# Patient Record
Sex: FEMALE
[De-identification: names, ages, dates, MRNs, and addresses within clinical notes are randomized per-mention and may not be internally consistent; named-entity substitution may affect disease eponyms.]

---

## 2017-06-14 ENCOUNTER — HOSPITAL ENCOUNTER (OUTPATIENT)
Dept: HOSPITAL 11 - JP.SDS | Age: 67
Discharge: HOME | End: 2017-06-14
Attending: SURGERY
Payer: COMMERCIAL

## 2017-06-14 VITALS — SYSTOLIC BLOOD PRESSURE: 146 MMHG | DIASTOLIC BLOOD PRESSURE: 89 MMHG

## 2017-06-14 DIAGNOSIS — Z12.11: Primary | ICD-10-CM

## 2017-06-14 DIAGNOSIS — K21.9: ICD-10-CM

## 2017-06-14 DIAGNOSIS — E11.9: ICD-10-CM

## 2017-06-14 DIAGNOSIS — K57.30: ICD-10-CM

## 2017-06-14 DIAGNOSIS — I10: ICD-10-CM

## 2017-06-14 DIAGNOSIS — Z86.73: ICD-10-CM

## 2017-06-14 PROCEDURE — 45378 DIAGNOSTIC COLONOSCOPY: CPT

## 2017-06-14 NOTE — OR
DATE OF PROCEDURE:  06/14/2017

 

PREOPERATIVE DIAGNOSIS:  Colon cancer screening.

 

POSTOPERATIVE DIAGNOSIS:  Diverticulosis.

 

PROCEDURE:  Colonoscopy to the cecum.

 

ANESTHESIA:  IV anesthesia with monitored anesthesia care.

 

INDICATIONS:  This 66-year-old white female is referred for a colonoscopy for colon cancer

screening.  She says her last colonoscopic exam was done 10 years ago.  I counseled her for

the procedure including risks and alternatives, and she gave her informed consent to

proceed.

 

DESCRIPTION OF PROCEDURE:  The patient was placed in the left lateral decubitus position.

IV anesthesia was administered by the Anesthesia Service.  Time-out was held.  A rectal exam

was performed, which was unremarkable.  The flexible video Olympus colonoscope was

introduced through her anus, up her rectum, and out her colon all the way to the cecum.  En

route, we saw a few scattered left-sided diverticula.  Once the cecum was reached, the scope

was slowly withdrawn examining the mucosa throughout.  No mucosal abnormalities were noted,

other than the left-sided diverticula.  There was no bleeding or inflammation associated

with any of them.  The scope was retroflexed in the rectum with the distal rectum appearing

unremarkable.  The scope was straightened and removed.  She tolerated the procedure well.

 

 

 

 

Nasir Freire MD

DD:  06/14/2017 09:32:21

DT:  06/14/2017 10:55:19

Job #:  [id number]

## 2023-10-07 ENCOUNTER — HOSPITAL ENCOUNTER (INPATIENT)
Dept: HOSPITAL 11 - JP.ED | Age: 73
LOS: 5 days | Discharge: HOME | DRG: 392 | End: 2023-10-12
Attending: INTERNAL MEDICINE | Admitting: INTERNAL MEDICINE
Payer: MEDICARE

## 2023-10-07 DIAGNOSIS — Z88.5: ICD-10-CM

## 2023-10-07 DIAGNOSIS — E03.9: ICD-10-CM

## 2023-10-07 DIAGNOSIS — Z88.2: ICD-10-CM

## 2023-10-07 DIAGNOSIS — Z90.79: ICD-10-CM

## 2023-10-07 DIAGNOSIS — Z79.82: ICD-10-CM

## 2023-10-07 DIAGNOSIS — Z85.41: ICD-10-CM

## 2023-10-07 DIAGNOSIS — Z79.84: ICD-10-CM

## 2023-10-07 DIAGNOSIS — Z86.73: ICD-10-CM

## 2023-10-07 DIAGNOSIS — Z20.822: ICD-10-CM

## 2023-10-07 DIAGNOSIS — Z79.899: ICD-10-CM

## 2023-10-07 DIAGNOSIS — E86.0: ICD-10-CM

## 2023-10-07 DIAGNOSIS — K52.9: Primary | ICD-10-CM

## 2023-10-07 DIAGNOSIS — Z90.12: ICD-10-CM

## 2023-10-07 DIAGNOSIS — Z90.722: ICD-10-CM

## 2023-10-07 DIAGNOSIS — I10: ICD-10-CM

## 2023-10-07 DIAGNOSIS — Z98.890: ICD-10-CM

## 2023-10-07 DIAGNOSIS — Z90.710: ICD-10-CM

## 2023-10-07 DIAGNOSIS — K59.09: ICD-10-CM

## 2023-10-07 DIAGNOSIS — E11.3553: ICD-10-CM

## 2023-10-07 DIAGNOSIS — Z88.8: ICD-10-CM

## 2023-10-07 DIAGNOSIS — T81.49XA: ICD-10-CM

## 2023-10-07 DIAGNOSIS — M19.90: ICD-10-CM

## 2023-10-07 DIAGNOSIS — Z85.3: ICD-10-CM

## 2023-10-07 LAB
ALBUMIN SERPL-MCNC: 3.7 G/DL (ref 3.4–5)
ALBUMIN/GLOB SERPL: 1 {RATIO} (ref 1.2–2.2)
ALP SERPL-CCNC: 82 U/L (ref 46–116)
ALT SERPL-CCNC: 31 U/L (ref 12–78)
ANION GAP SERPL CALC-SCNC: 16.5 MMOL/L (ref 5–14)
APTT PPP: 24.3 SEC (ref 21.8–27.3)
AST SERPL-CCNC: 20 U/L (ref 15–37)
BASOPHILS # BLD AUTO: 0.03 K/UL (ref 0–0.1)
BASOPHILS NFR BLD AUTO: 0.2 % (ref 0.1–1.3)
BILIRUB SERPL-MCNC: 0.5 MG/DL (ref 0.2–1)
BUN SERPL-MCNC: 14 MG/DL (ref 7–18)
CALCIUM SERPL-MCNC: 8.9 MG/DL (ref 8.5–10.1)
CHLORIDE SERPL-SCNC: 99 MMOL/L (ref 100–108)
CO2 SERPL-SCNC: 24 MMOL/L (ref 21–32)
CREAT CL 24H UR+SERPL-VRATE: 66.33 ML/MIN
CREAT SERPL-MCNC: 0.7 MG/DL (ref 0.6–1)
CRP SERPL-MCNC: 2.37 MG/DL (ref 0–0.3)
EOSINOPHIL # BLD AUTO: 0.23 K/UL (ref 0–0.4)
EOSINOPHIL NFR BLD AUTO: 1.5 % (ref 0–5.4)
GLOBULIN SER-MCNC: 3.6 G/DL (ref 2.3–3.5)
GLUCOSE SERPL-MCNC: 164 MG/DL (ref 74–106)
HCT VFR BLD AUTO: 47.9 % (ref 34.3–46)
HGB BLD-MCNC: 16.6 G/DL (ref 11.2–15.5)
IMM GRANULOCYTES # BLD: 0.07 K/UL (ref 0–0.23)
IMM GRANULOCYTES NFR BLD: 0.4 % (ref 0–0.7)
INR PPP: 1
LACTATE SERPL-SCNC: 1.1 MMOL/L (ref 0.4–2)
LYMPHOCYTES # BLD AUTO: 2.2 K/UL (ref 0.8–3.3)
LYMPHOCYTES NFR BLD AUTO: 14.1 % (ref 11.4–47.7)
MCH RBC QN AUTO: 28.9 PG (ref 31.6–35.5)
MCHC RBC AUTO-ENTMCNC: 34.7 G/DL (ref 31.6–35.5)
MCHC RBC AUTO-ENTMCNC: 83.4 FL (ref 81.4–99)
MONOCYTES # BLD AUTO: 1.19 K/UL (ref 0.2–0.9)
MONOCYTES NFR BLD AUTO: 7.6 % (ref 3.3–12.6)
NEUTROPHILS # BLD AUTO: 11.84 K/UL (ref 1–7.6)
NEUTROPHILS NFR BLD AUTO: 76.2 % (ref 40–78.1)
PLATELET # BLD AUTO: 337 K/UL (ref 130–375)
POTASSIUM SERPL-SCNC: 3.5 MMOL/L (ref 3.6–5.2)
PROT SERPL-MCNC: 7.3 G/DL (ref 6.4–8.2)
PROTHROMBIN TIME: 10.3 SEC (ref 9.2–10.6)
RBC # BLD AUTO: 5.74 M/UL (ref 3.77–5.24)
SODIUM SERPL-SCNC: 136 MMOL/L (ref 140–148)
WBC # BLD AUTO: 15.6 K/UL (ref 3.2–11)

## 2023-10-07 PROCEDURE — U0002 COVID-19 LAB TEST NON-CDC: HCPCS

## 2023-10-07 PROCEDURE — 82272 OCCULT BLD FECES 1-3 TESTS: CPT

## 2023-10-07 PROCEDURE — 87046 STOOL CULTR AEROBIC BACT EA: CPT

## 2023-10-07 PROCEDURE — C9113 INJ PANTOPRAZOLE SODIUM, VIA: HCPCS

## 2023-10-07 PROCEDURE — 36415 COLL VENOUS BLD VENIPUNCTURE: CPT

## 2023-10-07 PROCEDURE — 86140 C-REACTIVE PROTEIN: CPT

## 2023-10-07 PROCEDURE — 80053 COMPREHEN METABOLIC PANEL: CPT

## 2023-10-07 PROCEDURE — 85610 PROTHROMBIN TIME: CPT

## 2023-10-07 PROCEDURE — 87493 C DIFF AMPLIFIED PROBE: CPT

## 2023-10-07 PROCEDURE — 83605 ASSAY OF LACTIC ACID: CPT

## 2023-10-07 PROCEDURE — 85730 THROMBOPLASTIN TIME PARTIAL: CPT

## 2023-10-07 PROCEDURE — 74177 CT ABD & PELVIS W/CONTRAST: CPT

## 2023-10-07 PROCEDURE — 96361 HYDRATE IV INFUSION ADD-ON: CPT

## 2023-10-07 PROCEDURE — 96374 THER/PROPH/DIAG INJ IV PUSH: CPT

## 2023-10-07 PROCEDURE — 99285 EMERGENCY DEPT VISIT HI MDM: CPT

## 2023-10-07 PROCEDURE — 87899 AGENT NOS ASSAY W/OPTIC: CPT

## 2023-10-07 PROCEDURE — 85025 COMPLETE CBC W/AUTO DIFF WBC: CPT

## 2023-10-08 LAB
ANION GAP SERPL CALC-SCNC: 12.2 MMOL/L (ref 5–14)
BASOPHILS # BLD AUTO: 0.04 K/UL (ref 0–0.1)
BASOPHILS NFR BLD AUTO: 0.4 % (ref 0.1–1.3)
BUN SERPL-MCNC: 13 MG/DL (ref 7–18)
CALCIUM SERPL-MCNC: 7.8 MG/DL (ref 8.5–10.1)
CHLORIDE SERPL-SCNC: 104 MMOL/L (ref 100–108)
CO2 SERPL-SCNC: 26 MMOL/L (ref 21–32)
CREAT CL 24H UR+SERPL-VRATE: 66.33 ML/MIN
CREAT SERPL-MCNC: 0.7 MG/DL (ref 0.6–1)
EOSINOPHIL # BLD AUTO: 0.46 K/UL (ref 0–0.4)
EOSINOPHIL NFR BLD AUTO: 4.3 % (ref 0–5.4)
GLUCOSE SERPL-MCNC: 131 MG/DL (ref 74–106)
HCT VFR BLD AUTO: 41.5 % (ref 34.3–46)
HGB BLD-MCNC: 14.2 G/DL (ref 11.2–15.5)
IMM GRANULOCYTES # BLD: 0.05 K/UL (ref 0–0.23)
IMM GRANULOCYTES NFR BLD: 0.5 % (ref 0–0.7)
LYMPHOCYTES # BLD AUTO: 2.52 K/UL (ref 0.8–3.3)
LYMPHOCYTES NFR BLD AUTO: 23.8 % (ref 11.4–47.7)
MCH RBC QN AUTO: 29.2 PG (ref 31.6–35.5)
MCHC RBC AUTO-ENTMCNC: 34.2 G/DL (ref 31.6–35.5)
MCHC RBC AUTO-ENTMCNC: 85.4 FL (ref 81.4–99)
MONOCYTES # BLD AUTO: 0.79 K/UL (ref 0.2–0.9)
MONOCYTES NFR BLD AUTO: 7.4 % (ref 3.3–12.6)
NEUTROPHILS # BLD AUTO: 6.75 K/UL (ref 1–7.6)
NEUTROPHILS NFR BLD AUTO: 63.6 % (ref 40–78.1)
PLATELET # BLD AUTO: 269 K/UL (ref 130–375)
POTASSIUM SERPL-SCNC: 3.2 MMOL/L (ref 3.6–5.2)
RBC # BLD AUTO: 4.86 M/UL (ref 3.77–5.24)
SODIUM SERPL-SCNC: 139 MMOL/L (ref 140–148)
WBC # BLD AUTO: 10.6 K/UL (ref 3.2–11)

## 2023-10-08 RX ADMIN — HYDROCODONE BITARTRATE AND ACETAMINOPHEN PRN TAB: 5; 325 TABLET ORAL at 13:15

## 2023-10-08 RX ADMIN — POTASSIUM CHLORIDE SCH: 750 CAPSULE, EXTENDED RELEASE ORAL at 09:06

## 2023-10-08 RX ADMIN — HYDROCODONE BITARTRATE AND ACETAMINOPHEN PRN TAB: 5; 325 TABLET ORAL at 00:19

## 2023-10-08 RX ADMIN — TRIAMCINOLONE ACETONIDE PRN APPLIC: 1 CREAM TOPICAL at 10:27

## 2023-10-08 RX ADMIN — HYDROCODONE BITARTRATE AND ACETAMINOPHEN PRN TAB: 5; 325 TABLET ORAL at 05:29

## 2023-10-08 RX ADMIN — INSULIN LISPRO SCH: 100 INJECTION, SOLUTION INTRAVENOUS; SUBCUTANEOUS at 07:20

## 2023-10-08 RX ADMIN — INSULIN LISPRO SCH UNIT: 100 INJECTION, SOLUTION INTRAVENOUS; SUBCUTANEOUS at 21:12

## 2023-10-08 RX ADMIN — TRIAMCINOLONE ACETONIDE PRN APPLIC: 1 CREAM TOPICAL at 19:24

## 2023-10-08 RX ADMIN — Medication SCH EACH: at 12:59

## 2023-10-08 RX ADMIN — METRONIDAZOLE SCH MLS/HR: 500 SOLUTION INTRAVENOUS at 09:05

## 2023-10-08 RX ADMIN — Medication SCH EACH: at 21:13

## 2023-10-08 RX ADMIN — METRONIDAZOLE SCH MLS/HR: 500 SOLUTION INTRAVENOUS at 18:09

## 2023-10-08 RX ADMIN — Medication SCH EACH: at 12:58

## 2023-10-08 RX ADMIN — INSULIN LISPRO SCH UNIT: 100 INJECTION, SOLUTION INTRAVENOUS; SUBCUTANEOUS at 12:08

## 2023-10-08 RX ADMIN — ONDANSETRON PRN MG: 2 INJECTION, SOLUTION INTRAMUSCULAR; INTRAVENOUS at 09:00

## 2023-10-08 RX ADMIN — INSULIN LISPRO SCH: 100 INJECTION, SOLUTION INTRAVENOUS; SUBCUTANEOUS at 16:53

## 2023-10-09 LAB
ANION GAP SERPL CALC-SCNC: 12.2 MMOL/L (ref 5–14)
BUN SERPL-MCNC: 4 MG/DL (ref 7–18)
CALCIUM SERPL-MCNC: 7.6 MG/DL (ref 8.5–10.1)
CHLORIDE SERPL-SCNC: 107 MMOL/L (ref 100–108)
CO2 SERPL-SCNC: 26 MMOL/L (ref 21–32)
CREAT CL 24H UR+SERPL-VRATE: 77.38 ML/MIN
CREAT SERPL-MCNC: 0.6 MG/DL (ref 0.6–1)
GLUCOSE SERPL-MCNC: 122 MG/DL (ref 74–106)
HCT VFR BLD AUTO: 39.3 % (ref 34.3–46)
HGB BLD-MCNC: 13.1 G/DL (ref 11.2–15.5)
MCH RBC QN AUTO: 28.7 PG (ref 31.6–35.5)
MCHC RBC AUTO-ENTMCNC: 33.3 G/DL (ref 31.6–35.5)
MCHC RBC AUTO-ENTMCNC: 86.2 FL (ref 81.4–99)
PLATELET # BLD AUTO: 246 K/UL (ref 130–375)
POTASSIUM SERPL-SCNC: 3.2 MMOL/L (ref 3.6–5.2)
RBC # BLD AUTO: 4.56 M/UL (ref 3.77–5.24)
SODIUM SERPL-SCNC: 142 MMOL/L (ref 140–148)
WBC # BLD AUTO: 7.2 K/UL (ref 3.2–11)

## 2023-10-09 RX ADMIN — METRONIDAZOLE SCH MLS/HR: 500 SOLUTION INTRAVENOUS at 09:14

## 2023-10-09 RX ADMIN — INSULIN LISPRO SCH: 100 INJECTION, SOLUTION INTRAVENOUS; SUBCUTANEOUS at 08:22

## 2023-10-09 RX ADMIN — METRONIDAZOLE SCH MLS/HR: 500 SOLUTION INTRAVENOUS at 01:00

## 2023-10-09 RX ADMIN — Medication SCH EACH: at 09:17

## 2023-10-09 RX ADMIN — ONDANSETRON PRN MG: 2 INJECTION, SOLUTION INTRAMUSCULAR; INTRAVENOUS at 17:16

## 2023-10-09 RX ADMIN — TRIAMCINOLONE ACETONIDE PRN APPLIC: 1 CREAM TOPICAL at 14:10

## 2023-10-09 RX ADMIN — HYDROCODONE BITARTRATE AND ACETAMINOPHEN PRN TAB: 5; 325 TABLET ORAL at 21:41

## 2023-10-09 RX ADMIN — HYDROCODONE BITARTRATE AND ACETAMINOPHEN PRN TAB: 5; 325 TABLET ORAL at 04:05

## 2023-10-09 RX ADMIN — ONDANSETRON PRN MG: 2 INJECTION, SOLUTION INTRAMUSCULAR; INTRAVENOUS at 07:25

## 2023-10-09 RX ADMIN — TRIAMCINOLONE ACETONIDE PRN APPLIC: 1 CREAM TOPICAL at 19:52

## 2023-10-09 RX ADMIN — METRONIDAZOLE SCH MLS/HR: 500 SOLUTION INTRAVENOUS at 16:17

## 2023-10-09 RX ADMIN — INSULIN LISPRO SCH: 100 INJECTION, SOLUTION INTRAVENOUS; SUBCUTANEOUS at 21:04

## 2023-10-09 RX ADMIN — POTASSIUM CHLORIDE SCH: 750 CAPSULE, EXTENDED RELEASE ORAL at 09:17

## 2023-10-09 RX ADMIN — INSULIN LISPRO SCH: 100 INJECTION, SOLUTION INTRAVENOUS; SUBCUTANEOUS at 11:24

## 2023-10-09 RX ADMIN — Medication SCH EACH: at 21:45

## 2023-10-09 RX ADMIN — INSULIN LISPRO SCH: 100 INJECTION, SOLUTION INTRAVENOUS; SUBCUTANEOUS at 17:22

## 2023-10-10 LAB
ANION GAP SERPL CALC-SCNC: 14.5 MMOL/L (ref 5–14)
BUN SERPL-MCNC: 4 MG/DL (ref 7–18)
CALCIUM SERPL-MCNC: 8.7 MG/DL (ref 8.5–10.1)
CHLORIDE SERPL-SCNC: 104 MMOL/L (ref 100–108)
CO2 SERPL-SCNC: 26 MMOL/L (ref 21–32)
CREAT CL 24H UR+SERPL-VRATE: 66.33 ML/MIN
CREAT SERPL-MCNC: 0.7 MG/DL (ref 0.6–1)
GLUCOSE SERPL-MCNC: 111 MG/DL (ref 74–106)
POTASSIUM SERPL-SCNC: 3.5 MMOL/L (ref 3.6–5.2)
SODIUM SERPL-SCNC: 141 MMOL/L (ref 140–148)

## 2023-10-10 RX ADMIN — INSULIN LISPRO SCH: 100 INJECTION, SOLUTION INTRAVENOUS; SUBCUTANEOUS at 16:27

## 2023-10-10 RX ADMIN — METRONIDAZOLE SCH MLS/HR: 500 SOLUTION INTRAVENOUS at 08:32

## 2023-10-10 RX ADMIN — POTASSIUM CHLORIDE SCH: 750 CAPSULE, EXTENDED RELEASE ORAL at 08:33

## 2023-10-10 RX ADMIN — Medication SCH EACH: at 09:45

## 2023-10-10 RX ADMIN — INSULIN LISPRO SCH: 100 INJECTION, SOLUTION INTRAVENOUS; SUBCUTANEOUS at 07:40

## 2023-10-10 RX ADMIN — METRONIDAZOLE SCH MLS/HR: 500 SOLUTION INTRAVENOUS at 16:05

## 2023-10-10 RX ADMIN — ONDANSETRON PRN MG: 2 INJECTION, SOLUTION INTRAMUSCULAR; INTRAVENOUS at 07:50

## 2023-10-10 RX ADMIN — HYDROCODONE BITARTRATE AND ACETAMINOPHEN PRN TAB: 5; 325 TABLET ORAL at 21:55

## 2023-10-10 RX ADMIN — Medication SCH EACH: at 21:54

## 2023-10-10 RX ADMIN — INSULIN LISPRO SCH UNIT: 100 INJECTION, SOLUTION INTRAVENOUS; SUBCUTANEOUS at 12:21

## 2023-10-10 RX ADMIN — Medication SCH EACH: at 08:34

## 2023-10-10 RX ADMIN — INSULIN LISPRO SCH: 100 INJECTION, SOLUTION INTRAVENOUS; SUBCUTANEOUS at 21:09

## 2023-10-10 RX ADMIN — METRONIDAZOLE SCH MLS/HR: 500 SOLUTION INTRAVENOUS at 01:52

## 2023-10-11 RX ADMIN — METRONIDAZOLE SCH MLS/HR: 500 SOLUTION INTRAVENOUS at 01:48

## 2023-10-11 RX ADMIN — TRIAMCINOLONE ACETONIDE PRN APPLIC: 1 CREAM TOPICAL at 11:24

## 2023-10-11 RX ADMIN — Medication SCH EACH: at 21:32

## 2023-10-11 RX ADMIN — HYDROCODONE BITARTRATE AND ACETAMINOPHEN PRN TAB: 5; 325 TABLET ORAL at 21:36

## 2023-10-11 RX ADMIN — HYDROCODONE BITARTRATE AND ACETAMINOPHEN PRN TAB: 5; 325 TABLET ORAL at 10:38

## 2023-10-11 RX ADMIN — INSULIN LISPRO SCH: 100 INJECTION, SOLUTION INTRAVENOUS; SUBCUTANEOUS at 21:22

## 2023-10-11 RX ADMIN — INSULIN LISPRO SCH: 100 INJECTION, SOLUTION INTRAVENOUS; SUBCUTANEOUS at 16:48

## 2023-10-11 RX ADMIN — POTASSIUM CHLORIDE SCH: 750 CAPSULE, EXTENDED RELEASE ORAL at 08:51

## 2023-10-11 RX ADMIN — INSULIN LISPRO SCH: 100 INJECTION, SOLUTION INTRAVENOUS; SUBCUTANEOUS at 07:34

## 2023-10-11 RX ADMIN — ONDANSETRON PRN MG: 2 INJECTION, SOLUTION INTRAMUSCULAR; INTRAVENOUS at 07:47

## 2023-10-11 RX ADMIN — SODIUM CHLORIDE SCH MLS/HR: 9 INJECTION, SOLUTION INTRAVENOUS at 13:44

## 2023-10-11 RX ADMIN — METRONIDAZOLE SCH MLS/HR: 500 SOLUTION INTRAVENOUS at 09:49

## 2023-10-11 RX ADMIN — INSULIN LISPRO SCH: 100 INJECTION, SOLUTION INTRAVENOUS; SUBCUTANEOUS at 11:41

## 2023-10-11 RX ADMIN — Medication SCH EACH: at 09:52

## 2023-10-11 RX ADMIN — SODIUM CHLORIDE SCH MLS/HR: 9 INJECTION, SOLUTION INTRAVENOUS at 11:25

## 2023-10-11 RX ADMIN — Medication SCH EACH: at 08:53

## 2023-10-12 VITALS — DIASTOLIC BLOOD PRESSURE: 76 MMHG | SYSTOLIC BLOOD PRESSURE: 129 MMHG | HEART RATE: 72 BPM

## 2023-10-12 RX ADMIN — Medication SCH EACH: at 09:14

## 2023-10-12 RX ADMIN — INSULIN LISPRO SCH: 100 INJECTION, SOLUTION INTRAVENOUS; SUBCUTANEOUS at 09:19

## 2023-10-12 RX ADMIN — HYDROCODONE BITARTRATE AND ACETAMINOPHEN PRN TAB: 5; 325 TABLET ORAL at 05:06

## 2023-10-12 RX ADMIN — POTASSIUM CHLORIDE SCH MEQ: 750 CAPSULE, EXTENDED RELEASE ORAL at 09:14

## 2023-10-12 RX ADMIN — INSULIN LISPRO SCH: 100 INJECTION, SOLUTION INTRAVENOUS; SUBCUTANEOUS at 13:24
